# Patient Record
Sex: MALE | Race: WHITE | Employment: OTHER | URBAN - METROPOLITAN AREA
[De-identification: names, ages, dates, MRNs, and addresses within clinical notes are randomized per-mention and may not be internally consistent; named-entity substitution may affect disease eponyms.]

---

## 2021-08-02 ENCOUNTER — THERAPY VISIT (OUTPATIENT)
Dept: PHYSICAL THERAPY | Facility: CLINIC | Age: 81
End: 2021-08-02
Payer: MEDICARE

## 2021-08-02 DIAGNOSIS — Z96.653 AFTERCARE FOLLOWING BILATERAL KNEE JOINT REPLACEMENT SURGERY: ICD-10-CM

## 2021-08-02 DIAGNOSIS — Z47.1 AFTERCARE FOLLOWING BILATERAL KNEE JOINT REPLACEMENT SURGERY: ICD-10-CM

## 2021-08-02 DIAGNOSIS — R26.9 GAIT ABNORMALITY: ICD-10-CM

## 2021-08-02 DIAGNOSIS — Z96.652 PRESENCE OF LEFT ARTIFICIAL KNEE JOINT: ICD-10-CM

## 2021-08-02 DIAGNOSIS — Z96.651 PRESENCE OF RIGHT ARTIFICIAL KNEE JOINT: ICD-10-CM

## 2021-08-02 PROCEDURE — 97112 NEUROMUSCULAR REEDUCATION: CPT | Mod: GP | Performed by: PHYSICAL THERAPIST

## 2021-08-02 PROCEDURE — 97110 THERAPEUTIC EXERCISES: CPT | Mod: GP | Performed by: PHYSICAL THERAPIST

## 2021-08-02 PROCEDURE — 97161 PT EVAL LOW COMPLEX 20 MIN: CPT | Mod: GP | Performed by: PHYSICAL THERAPIST

## 2021-08-02 ASSESSMENT — ACTIVITIES OF DAILY LIVING (ADL)
GIVING WAY, BUCKLING OR SHIFTING OF KNEE: I HAVE THE SYMPTOM BUT IT DOES NOT AFFECT MY ACTIVITY
SQUAT: ACTIVITY IS FAIRLY DIFFICULT
GO UP STAIRS: ACTIVITY IS MINIMALLY DIFFICULT
KNEE_ACTIVITY_OF_DAILY_LIVING_SCORE: 75.71
KNEEL ON THE FRONT OF YOUR KNEE: ACTIVITY IS SOMEWHAT DIFFICULT
SIT WITH YOUR KNEE BENT: ACTIVITY IS MINIMALLY DIFFICULT
SWELLING: THE SYMPTOM AFFECTS MY ACTIVITY SLIGHTLY
WEAKNESS: THE SYMPTOM AFFECTS MY ACTIVITY SLIGHTLY
KNEE_ACTIVITY_OF_DAILY_LIVING_SUM: 53
STAND: ACTIVITY IS NOT DIFFICULT
RAW_SCORE: 53
GO DOWN STAIRS: ACTIVITY IS MINIMALLY DIFFICULT
RISE FROM A CHAIR: ACTIVITY IS SOMEWHAT DIFFICULT
STIFFNESS: THE SYMPTOM AFFECTS MY ACTIVITY SLIGHTLY
WALK: ACTIVITY IS NOT DIFFICULT
AS_A_RESULT_OF_YOUR_KNEE_INJURY,_HOW_WOULD_YOU_RATE_YOUR_CURRENT_LEVEL_OF_DAILY_ACTIVITY?: ABNORMAL
HOW_WOULD_YOU_RATE_THE_OVERALL_FUNCTION_OF_YOUR_KNEE_DURING_YOUR_USUAL_DAILY_ACTIVITIES?: ABNORMAL
PAIN: I DO NOT HAVE THE SYMPTOM
HOW_WOULD_YOU_RATE_THE_CURRENT_FUNCTION_OF_YOUR_KNEE_DURING_YOUR_USUAL_DAILY_ACTIVITIES_ON_A_SCALE_FROM_0_TO_100_WITH_100_BEING_YOUR_LEVEL_OF_KNEE_FUNCTION_PRIOR_TO_YOUR_INJURY_AND_0_BEING_THE_INABILITY_TO_PERFORM_ANY_OF_YOUR_USUAL_DAILY_ACTIVITIES?: 65
LIMPING: I DO NOT HAVE THE SYMPTOM

## 2021-08-02 NOTE — LETTER
DEPARTMENT OF HEALTH AND HUMAN SERVICES  CENTERS FOR MEDICARE & MEDICAID SERVICES    PLAN/UPDATED PLAN OF PROGRESS FOR OUTPATIENT REHABILITATION     PATIENTS NAME:  Gentry Akers   : 1940  PROVIDER NUMBER:    [id number]  HICN:   [id number]   [facility]ER NAME: [facility] [place]  MEDICAL RECORD NUMBER: [id number]   START OF CARE DATE:  SOC Date: 21   TYPE:  PT  PRIMARY/TREATMENT DIAGNOSIS: (Pertinent Medical Diagnosis)     Aftercare following bilateral knee joint replacement surgery  Presence of left artificial knee joint  Presence of right artificial knee joint  Gait abnormality    VISITS FROM START OF CARE:  Rxs Used: 1     Physical Therapy Initial Evaluation  Subjective:  The history is provided by the patient. No  was used.   Therapist Generated HPI Evaluation  Problem details: I had wear and tear in both knees.  I was bone on bone.  I had TKA : Left 2021, right 2021.  I had a cardiac stent 14 years ago.  I was doing rehab in [place].  .         Type of problem:  Bilateral knees.    This is a new condition.  Condition occurred with:  Degenerative joint disease.  Where condition occurred: for unknown reasons.  Patient reports pain:  Medial, lateral and anterior.  Pain quality: tightness. and is constant.  Pain radiates to:  Thigh, knee and lower leg. Pain is worse in the P.M..  Since onset symptoms are gradually improving.  Associated symptoms:  Edema. Symptoms are exacerbated by ascending stairs, descending stairs and bending/squatting  and relieved by rest.  Special tests included:  X-ray (looks good ).  Previous treatment includes physical therapy. There was moderate improvement following previous treatment.  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    Patient Health History  Gentry Akers being seen for  bilateral TKA.   Problem began: 2021.   Problem occurred: TKA   Pain is reported as 1/10 (sitffness) on pain scale.  General health as reported by patient is excellent.  Pertinent medical history includes: heart problems and osteoarthritis.      PATIENTS NAME:  Gentry Akers   : 1940  Surgeries include:  Heart surgery and orthopedic surgery. Other surgery history details: stent,  right TSA, TKA bilateral , hernia left abdominal .    Current medications:  Heparin/coumadin.    Current occupation is semiretired. .   Primary job tasks include:  Prolonged sitting, computer work, prolonged standing and pushing/pulling.   Other job/home tasks details: bike, work out club.                  Objective:  Standing Alignment:    Cervical/Thoracic:  Forward head (poor sitting posture)  Shoulder/UE:  Rounded shoulders  Lumbar:  Lordosis decr  Ankle/Foot:  Pes planus L and pes planus R  Gait:    Deviations:  Shoulder:  Shoulder hiking L and decr arm swing LHip:  Excessive flexion L and excessive flexion RAnkle:  Pronation incr L and pronation incr R  Flexibility/Screens:   Positive screens:  Knee  Lower Extremity:  Decreased left lower extremity flexibility:Hip Flexors and Quadriceps  Decreased right lower extremity flexibility:  Hip Flexors and Quadriceps       Knee Evaluation:  ROM:    AROM  Extension:  Left: 10    Right:  8  Flexion: Left: 115    Right: 120  PROM  Hyperextension: Left: 0    Right:  0  Extension: Left: 0    Right:  0  Flexion: Left: 123    Right:  122  Pain: bilateral hip abduction 4/5  Strength:   Extension:  Left: 5/5      Right: 5/5     Flexion:  Left: 5/5       Right: 5/5     Quad Set Left: Fair      Quad Set Right: Fair      Palpation:    Left knee tenderness present at:  Patellar Medial; Patellar Lateral and Patellar Superior  Right knee tenderness present at:  Patellar Medial and Patellar Lateral  Edema:  Edema of the knee: bilateral general knee swelling.  Mobility Testing:     Patellofemoral Medial:  Left: normal    Right: normal  Patellofemoral Lateral:  Left: hypomobile    Right: normal  Patellofemoral Superior:  Left: hypomobile    Right: normal  Patellofemoral Inferior:  Left: hypomobile                PATIENTS NAME:  Gentry Akers   : 1940    Functional Testing:    Quad:    Single Leg Squat:  Left:      Right:        Bilateral Leg Squat:   Significant loss of control    Proprioception:   Stork Balance Test:  Left:  3 sec  Right:  3 sec  % of Uninvolved:     Assessment/Plan:    Patient is a 81 year old male with both sides knee complaints.    Patient has the following significant findings with corresponding treatment plan.                Diagnosis 1:  Bilateral TKA   Pain -  hot/cold therapy, manual therapy, self management, education and home program  Decreased ROM/flexibility - manual therapy, therapeutic exercise, therapeutic activity and home program  Decreased joint mobility - manual therapy, therapeutic exercise, therapeutic activity and home program  Decreased strength - therapeutic exercise, therapeutic activities and home program  Impaired balance - neuro re-education, gait training, therapeutic activities and home program  Impaired gait - gait training and home program  Impaired muscle performance - neuro re-education and home program  Decreased function - therapeutic activities and home program    Therapy Evaluation Codes:   Cumulative Therapy Evaluation is: Low complexity.    Previous and current functional limitations:  (See Goal Flow Sheet for this information)    Short term and Long term goals: (See Goal Flow Sheet for this information)     Communication ability:  Patient appears to be able to clearly communicate and understand verbal and written communication and follow directions correctly.  Treatment Explanation - The following has been discussed with the patient:       RX ordered/plan of care  This patient would benefit from PT intervention to resume  "normal activities.   Rehab potential is good.  Frequency:  2 X week, once daily  Duration:  for 2 weeks tapering to 1 X a week over 8 weeks  Discharge Plan:  Achieve all LTG.  Independent in home treatment program.    Caregiver Signature/Credentials _____________________________ Date ________      Treating Provider: Jaye Szymanski PT    I have reviewed and certified the need for these services and plan of treatment while under my care.            PATIENTS NAME:  Gentry Akers   : 1940      PHYSICIAN'S SIGNATURE:   _________________________________________  Date___________   Milan Daily MD     Certification period:  Beginning of Cert date period: 21 to  End of Cert period date: 10/31/21     Functional Level Progress Report: Please see attached \"Goal Flow sheet for Functional level.\"    ____X____ Continue Services or       ________ DC Services                Service dates: From  SOC Date: 21 date to present                         "

## 2021-08-02 NOTE — PROGRESS NOTES
Physical Therapy Initial Evaluation  Subjective:  The history is provided by the patient. No  was used.   Therapist Generated HPI Evaluation  Problem details: I had wear and tear in both knees.  I was bone on bone.  I had TKA : Left 6/21/2021, right 6/24/2021.  I had a cardiac stent 14 years ago.  I was doing rehab in [place].  .         Type of problem:  Bilateral knees.    This is a new condition.  Condition occurred with:  Degenerative joint disease.  Where condition occurred: for unknown reasons.  Patient reports pain:  Medial, lateral and anterior.  Pain quality: tightness. and is constant.  Pain radiates to:  Thigh, knee and lower leg. Pain is worse in the P.M..  Since onset symptoms are gradually improving.  Associated symptoms:  Edema. Symptoms are exacerbated by ascending stairs, descending stairs and bending/squatting  and relieved by rest.  Special tests included:  X-ray (looks good ).  Previous treatment includes physical therapy. There was moderate improvement following previous treatment.  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    Patient Health History  Gentry Akers being seen for bilateral TKA.     Problem began: 6/24/2021.   Problem occurred: TKA   Pain is reported as 1/10 (sitffness) on pain scale.  General health as reported by patient is excellent.  Pertinent medical history includes: heart problems and osteoarthritis.        Surgeries include:  Heart surgery and orthopedic surgery. Other surgery history details: stent,  right TSA, TKA bilateral , hernia left abdominal .    Current medications:  Heparin/coumadin.    Current occupation is semiretired. .   Primary job tasks include:  Prolonged sitting, computer work, prolonged standing and pushing/pulling.   Other job/home tasks details: bike, work out club.                                   Objective:  Standing Alignment:    Cervical/Thoracic:  Forward head (poor sitting posture)  Shoulder/UE:  Rounded shoulders  Lumbar:  Lordosis decr        Ankle/Foot:  Pes planus L and pes planus R    Gait:      Deviations:  Shoulder:  Shoulder hiking L and decr arm swing LHip:  Excessive flexion L and excessive flexion RAnkle:  Pronation incr L and pronation incr R    Flexibility/Screens:   Positive screens:  Knee    Lower Extremity:  Decreased left lower extremity flexibility:Hip Flexors and Quadriceps    Decreased right lower extremity flexibility:  Hip Flexors and Quadriceps                                                      Knee Evaluation:  ROM:    AROM      Extension:  Left: 10    Right:  8  Flexion: Left: 115    Right: 120  PROM    Hyperextension: Left: 0    Right:  0  Extension: Left: 0    Right:  0  Flexion: Left: 123    Right:  122  Pain: bilateral hip abduction 4/5    Strength:     Extension:  Left: 5/5   Pain:      Right: 5/5   Pain:  Flexion:  Left: 5/5   Pain:      Right: 5/5   Pain:    Quad Set Left: Fair    Pain:   Quad Set Right: Fair    Pain:      Palpation:    Left knee tenderness present at:  Patellar Medial; Patellar Lateral and Patellar Superior  Right knee tenderness present at:  Patellar Medial and Patellar Lateral  Edema:  Edema of the knee: bilateral general knee swelling.    Mobility Testing:      Patellofemoral Medial:  Left: normal    Right: normal  Patellofemoral Lateral:  Left: hypomobile    Right: normal  Patellofemoral Superior:  Left: hypomobile    Right: normal  Patellofemoral Inferior:  Left: hypomobile      Functional Testing:          Quad:    Single Leg Squat:  Left:      Right:        Bilateral Leg Squat:   Significant loss of control      Proprioception:   Stork Balance Test:  Left:  3 sec  Right:  3 sec  % of Uninvolved:           General     ROS    Assessment/Plan:    Patient is a 81 year old male with both sides knee complaints.    Patient has the following  significant findings with corresponding treatment plan.                Diagnosis 1:  Bilateral TKA   Pain -  hot/cold therapy, manual therapy, self management, education and home program  Decreased ROM/flexibility - manual therapy, therapeutic exercise, therapeutic activity and home program  Decreased joint mobility - manual therapy, therapeutic exercise, therapeutic activity and home program  Decreased strength - therapeutic exercise, therapeutic activities and home program  Impaired balance - neuro re-education, gait training, therapeutic activities and home program  Impaired gait - gait training and home program  Impaired muscle performance - neuro re-education and home program  Decreased function - therapeutic activities and home program    Therapy Evaluation Codes:   Cumulative Therapy Evaluation is: Low complexity.    Previous and current functional limitations:  (See Goal Flow Sheet for this information)    Short term and Long term goals: (See Goal Flow Sheet for this information)     Communication ability:  Patient appears to be able to clearly communicate and understand verbal and written communication and follow directions correctly.  Treatment Explanation - The following has been discussed with the patient:   RX ordered/plan of care  This patient would benefit from PT intervention to resume normal activities.   Rehab potential is good.    Frequency:  2 X week, once daily  Duration:  for 2 weeks tapering to 1 X a week over 8 weeks  Discharge Plan:  Achieve all LTG.  Independent in home treatment program.    Please refer to the daily flowsheet for treatment today, total treatment time and time spent performing 1:1 timed codes.

## 2021-08-04 ENCOUNTER — THERAPY VISIT (OUTPATIENT)
Dept: PHYSICAL THERAPY | Facility: CLINIC | Age: 81
End: 2021-08-04
Payer: MEDICARE

## 2021-08-04 DIAGNOSIS — Z96.651 PRESENCE OF RIGHT ARTIFICIAL KNEE JOINT: ICD-10-CM

## 2021-08-04 DIAGNOSIS — Z47.1 AFTERCARE FOLLOWING BILATERAL KNEE JOINT REPLACEMENT SURGERY: ICD-10-CM

## 2021-08-04 DIAGNOSIS — Z96.652 PRESENCE OF LEFT ARTIFICIAL KNEE JOINT: ICD-10-CM

## 2021-08-04 DIAGNOSIS — Z96.653 AFTERCARE FOLLOWING BILATERAL KNEE JOINT REPLACEMENT SURGERY: ICD-10-CM

## 2021-08-04 DIAGNOSIS — R26.9 GAIT ABNORMALITY: ICD-10-CM

## 2021-08-04 PROCEDURE — 97110 THERAPEUTIC EXERCISES: CPT | Mod: GP | Performed by: PHYSICAL THERAPIST

## 2021-08-04 PROCEDURE — 97112 NEUROMUSCULAR REEDUCATION: CPT | Mod: GP | Performed by: PHYSICAL THERAPIST

## 2021-08-04 PROCEDURE — 97530 THERAPEUTIC ACTIVITIES: CPT | Mod: GP | Performed by: PHYSICAL THERAPIST

## 2021-08-09 ENCOUNTER — THERAPY VISIT (OUTPATIENT)
Dept: PHYSICAL THERAPY | Facility: CLINIC | Age: 81
End: 2021-08-09
Payer: MEDICARE

## 2021-08-09 DIAGNOSIS — Z96.652 PRESENCE OF LEFT ARTIFICIAL KNEE JOINT: ICD-10-CM

## 2021-08-09 DIAGNOSIS — Z96.653 AFTERCARE FOLLOWING BILATERAL KNEE JOINT REPLACEMENT SURGERY: ICD-10-CM

## 2021-08-09 DIAGNOSIS — Z47.1 AFTERCARE FOLLOWING BILATERAL KNEE JOINT REPLACEMENT SURGERY: ICD-10-CM

## 2021-08-09 DIAGNOSIS — R26.9 GAIT ABNORMALITY: ICD-10-CM

## 2021-08-09 DIAGNOSIS — Z96.651 PRESENCE OF RIGHT ARTIFICIAL KNEE JOINT: ICD-10-CM

## 2021-08-09 PROCEDURE — 97112 NEUROMUSCULAR REEDUCATION: CPT | Mod: GP | Performed by: PHYSICAL THERAPIST

## 2021-08-09 PROCEDURE — 97530 THERAPEUTIC ACTIVITIES: CPT | Mod: GP | Performed by: PHYSICAL THERAPIST

## 2021-08-09 PROCEDURE — 97110 THERAPEUTIC EXERCISES: CPT | Mod: GP | Performed by: PHYSICAL THERAPIST

## 2021-08-16 ENCOUNTER — THERAPY VISIT (OUTPATIENT)
Dept: PHYSICAL THERAPY | Facility: CLINIC | Age: 81
End: 2021-08-16
Payer: MEDICARE

## 2021-08-16 DIAGNOSIS — Z96.653 AFTERCARE FOLLOWING BILATERAL KNEE JOINT REPLACEMENT SURGERY: ICD-10-CM

## 2021-08-16 DIAGNOSIS — Z96.651 PRESENCE OF RIGHT ARTIFICIAL KNEE JOINT: ICD-10-CM

## 2021-08-16 DIAGNOSIS — Z47.1 AFTERCARE FOLLOWING BILATERAL KNEE JOINT REPLACEMENT SURGERY: ICD-10-CM

## 2021-08-16 DIAGNOSIS — Z96.652 PRESENCE OF LEFT ARTIFICIAL KNEE JOINT: ICD-10-CM

## 2021-08-16 DIAGNOSIS — R26.9 GAIT ABNORMALITY: ICD-10-CM

## 2021-08-16 PROCEDURE — 97530 THERAPEUTIC ACTIVITIES: CPT | Mod: GP | Performed by: PHYSICAL THERAPIST

## 2021-08-16 PROCEDURE — 97112 NEUROMUSCULAR REEDUCATION: CPT | Mod: GP | Performed by: PHYSICAL THERAPIST

## 2021-08-16 PROCEDURE — 97110 THERAPEUTIC EXERCISES: CPT | Mod: GP | Performed by: PHYSICAL THERAPIST

## 2021-08-23 ENCOUNTER — THERAPY VISIT (OUTPATIENT)
Dept: PHYSICAL THERAPY | Facility: CLINIC | Age: 81
End: 2021-08-23
Payer: MEDICARE

## 2021-08-23 DIAGNOSIS — Z96.652 PRESENCE OF LEFT ARTIFICIAL KNEE JOINT: ICD-10-CM

## 2021-08-23 DIAGNOSIS — Z96.653 AFTERCARE FOLLOWING BILATERAL KNEE JOINT REPLACEMENT SURGERY: ICD-10-CM

## 2021-08-23 DIAGNOSIS — R26.9 GAIT ABNORMALITY: ICD-10-CM

## 2021-08-23 DIAGNOSIS — Z47.1 AFTERCARE FOLLOWING BILATERAL KNEE JOINT REPLACEMENT SURGERY: ICD-10-CM

## 2021-08-23 DIAGNOSIS — Z96.651 PRESENCE OF RIGHT ARTIFICIAL KNEE JOINT: ICD-10-CM

## 2021-08-23 PROCEDURE — 97110 THERAPEUTIC EXERCISES: CPT | Mod: GP | Performed by: PHYSICAL THERAPIST

## 2021-08-23 PROCEDURE — 97530 THERAPEUTIC ACTIVITIES: CPT | Mod: GP | Performed by: PHYSICAL THERAPIST

## 2021-08-23 PROCEDURE — 97112 NEUROMUSCULAR REEDUCATION: CPT | Mod: GP | Performed by: PHYSICAL THERAPIST

## 2021-08-23 ASSESSMENT — ACTIVITIES OF DAILY LIVING (ADL)
AS_A_RESULT_OF_YOUR_KNEE_INJURY,_HOW_WOULD_YOU_RATE_YOUR_CURRENT_LEVEL_OF_DAILY_ACTIVITY?: NORMAL
WEAKNESS: I HAVE THE SYMPTOM BUT IT DOES NOT AFFECT MY ACTIVITY
GO UP STAIRS: ACTIVITY IS MINIMALLY DIFFICULT
PAIN: I DO NOT HAVE THE SYMPTOM
GIVING WAY, BUCKLING OR SHIFTING OF KNEE: I DO NOT HAVE THE SYMPTOM
RISE FROM A CHAIR: ACTIVITY IS MINIMALLY DIFFICULT
RAW_SCORE: 64
HOW_WOULD_YOU_RATE_THE_OVERALL_FUNCTION_OF_YOUR_KNEE_DURING_YOUR_USUAL_DAILY_ACTIVITIES?: NEARLY NORMAL
WALK: ACTIVITY IS NOT DIFFICULT
HOW_WOULD_YOU_RATE_THE_CURRENT_FUNCTION_OF_YOUR_KNEE_DURING_YOUR_USUAL_DAILY_ACTIVITIES_ON_A_SCALE_FROM_0_TO_100_WITH_100_BEING_YOUR_LEVEL_OF_KNEE_FUNCTION_PRIOR_TO_YOUR_INJURY_AND_0_BEING_THE_INABILITY_TO_PERFORM_ANY_OF_YOUR_USUAL_DAILY_ACTIVITIES?: 85
KNEE_ACTIVITY_OF_DAILY_LIVING_SCORE: 91.43
KNEE_ACTIVITY_OF_DAILY_LIVING_SUM: 64
STIFFNESS: I HAVE THE SYMPTOM BUT IT DOES NOT AFFECT MY ACTIVITY
SIT WITH YOUR KNEE BENT: ACTIVITY IS NOT DIFFICULT
SWELLING: I HAVE THE SYMPTOM BUT IT DOES NOT AFFECT MY ACTIVITY
KNEEL ON THE FRONT OF YOUR KNEE: ACTIVITY IS MINIMALLY DIFFICULT
GO DOWN STAIRS: ACTIVITY IS NOT DIFFICULT
SQUAT: ACTIVITY IS NOT DIFFICULT
STAND: ACTIVITY IS NOT DIFFICULT
LIMPING: I DO NOT HAVE THE SYMPTOM

## 2021-08-23 NOTE — PROGRESS NOTES
Subjective:  HPI  Physical Exam       Knee Activity of Daily Living Score: 91.43            Objective:  System    Physical Exam    General     ROS    Assessment/Plan:    DISCHARGE REPORT    Progress reporting period is from 8/2/2021 to 8/23/2021.       SUBJECTIVE  Subjective changes noted by patient:  I was able to golf yesterday, I got tired, but no problems with the knees.  Stiffness in the morning.  I am doing good.  I am about 84% of my normal.      Current Pain level: 0/10.      Initial Pain level: 5/10.   Changes in function:  Yes (See Goal flowsheet attached for changes in current functional level)  Adverse reaction to treatment or activity: None    OBJECTIVE  Changes noted in objective findings:  Yes, Balance : right 6 sec, left 13 sec.  Strength:  toe raises single leg difficulty, hip abduction right 4+/5, left 5/5, ham 5/5, quad 5-/5.   PROM 0-0-132 bilateral.  Walking with heel toe pattern working on step length and timing.  Able to go up and down 6 inch step with railing.  Discussed with patient progression of exercises and home exercise program.       ASSESSMENT/PLAN  Updated problem list and treatment plan: Diagnosis 1:  Bilateral TKA  Decreased strength - home program  Impaired balance - home program  Impaired gait - home program  Impaired muscle performance - home program  Decreased function - home program  STG/LTGs have been met or progress has been made towards goals:  Yes (See Goal flow sheet completed today.)  Assessment of Progress: The patient's condition is improving.  The patient's condition has potential to improve.  Self Management Plans:  Patient has been instructed in a home treatment program.  Patient  has been instructed in self management of symptoms.      Recommendations:  Patient would like to try exercises on his own.  Patient to call if any questions.  Emphasis to patient not to over do and to watch control.  Patient does have appointments left on original order and may return if  unable to progress with activity.  Thank you for the opportunity of working with this motivated individual.      Please refer to the daily flowsheet for treatment today, total treatment time and time spent performing 1:1 timed codes.

## 2021-08-23 NOTE — LETTER
[facility]  [address]  [place] [address]  [phone]    2021    Re: Gentry Akers   :   1940  MRN:  [id number]   REFERRING PHYSICIAN:   Milan Daily MD [facility]  Date of Initial Evaluation:  21  Visits:  Rxs Used: 5  Reason for Referral:     Aftercare following bilateral knee joint replacement surgery  Presence of left artificial knee joint  Presence of right artificial knee joint  Gait abnormality  Knee Activity of Daily Living Score: 91.43            DISCHARGE REPORT  Progress reporting period is from 2021 to 2021.       SUBJECTIVE  Subjective changes noted by patient:  I was able to golf yesterday, I got tired, but no problems with the knees.  Stiffness in the morning.  I am doing good.  I am about 84% of my normal.      Current Pain level: 0/10.      Initial Pain level: 5/10.   Changes in function:  Yes (See Goal flowsheet attached for changes in current functional level)  Adverse reaction to treatment or activity: None    OBJECTIVE  Changes noted in objective findings:  Yes, Balance : right 6 sec, left 13 sec.  Strength:  toe raises single leg difficulty, hip abduction right 4+/5, left 5/5, ham 5/5, quad 5-/5.   PROM 0-0-132 bilateral.  Walking with heel toe pattern working on step length and timing.  Able to go up and down 6 inch step with railing.  Discussed with patient progression of exercises and home exercise program.     ASSESSMENT/PLAN  Updated problem list and treatment plan: Diagnosis 1:  Bilateral TKA  Decreased strength - home program  Impaired balance - home program  Impaired gait - home program  Impaired muscle performance - home program  Decreased function - home program  STG/LTGs have been met or progress has been made towards goals:  Yes (See Goal flow sheet completed today.)  Assessment of Progress: The patient's condition is  improving.  The patient's condition has potential to improve.  Self Management Plans:  Patient has been instructed in a home treatment program.  Patient  has been instructed in self management of symptoms.  Re: Gentry Akers   :   1940      Recommendations:  Patient would like to try exercises on his own.  Patient to call if any questions.  Emphasis to patient not to over do and to watch control.  Patient does have appointments left on original order and may return if unable to progress with activity.  Thank you for the opportunity of working with this motivated individual.          Thank you for your referral.    INQUIRIES  Therapist: Jaye Szymanski PT  [facility] [place]  [address] [address]  Phone: [phone]  Fax: [phone]

## 2021-10-14 PROBLEM — R26.9 GAIT ABNORMALITY: Status: RESOLVED | Noted: 2021-08-02 | Resolved: 2021-10-14

## 2021-10-14 PROBLEM — Z47.1 AFTERCARE FOLLOWING BILATERAL KNEE JOINT REPLACEMENT SURGERY: Status: RESOLVED | Noted: 2021-08-02 | Resolved: 2021-10-14

## 2021-10-14 PROBLEM — Z96.653 AFTERCARE FOLLOWING BILATERAL KNEE JOINT REPLACEMENT SURGERY: Status: RESOLVED | Noted: 2021-08-02 | Resolved: 2021-10-14

## 2021-10-14 PROBLEM — Z96.652 PRESENCE OF LEFT ARTIFICIAL KNEE JOINT: Status: RESOLVED | Noted: 2021-08-02 | Resolved: 2021-10-14

## 2021-10-14 PROBLEM — Z96.651 PRESENCE OF RIGHT ARTIFICIAL KNEE JOINT: Status: RESOLVED | Noted: 2021-08-02 | Resolved: 2021-10-14

## 2021-11-16 ENCOUNTER — LAB (OUTPATIENT)
Dept: LAB | Facility: CLINIC | Age: 81
End: 2021-11-16
Payer: MEDICARE

## 2021-11-16 DIAGNOSIS — R79.89 SERUM THYROID HORMONE DECREASED: Primary | ICD-10-CM

## 2021-11-16 DIAGNOSIS — E07.9 DISEASE OF THYROID GLAND: ICD-10-CM

## 2021-11-16 DIAGNOSIS — E78.2 MIXED HYPERLIPIDEMIA: ICD-10-CM

## 2021-11-16 DIAGNOSIS — E55.9 VITAMIN D DEFICIENCY DISEASE: ICD-10-CM

## 2021-11-16 LAB
ALBUMIN SERPL-MCNC: 3.7 G/DL (ref 3.4–5)
ALP SERPL-CCNC: 76 U/L (ref 40–150)
ALT SERPL W P-5'-P-CCNC: 37 U/L (ref 0–70)
ANION GAP SERPL CALCULATED.3IONS-SCNC: 2 MMOL/L (ref 3–14)
AST SERPL W P-5'-P-CCNC: 36 U/L (ref 0–45)
BASOPHILS # BLD AUTO: 0 10E3/UL (ref 0–0.2)
BASOPHILS NFR BLD AUTO: 1 %
BILIRUB SERPL-MCNC: 0.8 MG/DL (ref 0.2–1.3)
BUN SERPL-MCNC: 27 MG/DL (ref 7–30)
CALCIUM SERPL-MCNC: 8.7 MG/DL (ref 8.5–10.1)
CHLORIDE BLD-SCNC: 109 MMOL/L (ref 94–109)
CHOLEST SERPL-MCNC: 154 MG/DL
CO2 SERPL-SCNC: 29 MMOL/L (ref 20–32)
CREAT SERPL-MCNC: 0.95 MG/DL (ref 0.66–1.25)
EOSINOPHIL # BLD AUTO: 0.3 10E3/UL (ref 0–0.7)
EOSINOPHIL NFR BLD AUTO: 4 %
ERYTHROCYTE [DISTWIDTH] IN BLOOD BY AUTOMATED COUNT: 13.3 % (ref 10–15)
FASTING STATUS PATIENT QL REPORTED: NO
FSH SERPL-ACNC: 36.8 IU/L (ref 0.7–10.8)
GFR SERPL CREATININE-BSD FRML MDRD: 75 ML/MIN/1.73M2
GLUCOSE BLD-MCNC: 85 MG/DL (ref 70–99)
HCT VFR BLD AUTO: 37.2 % (ref 40–53)
HDLC SERPL-MCNC: 89 MG/DL
HGB BLD-MCNC: 12.6 G/DL (ref 13.3–17.7)
IMM GRANULOCYTES # BLD: 0 10E3/UL
IMM GRANULOCYTES NFR BLD: 0 %
LDLC SERPL CALC-MCNC: 48 MG/DL
LH SERPL-ACNC: 19.2 IU/L (ref 3.1–34.6)
LYMPHOCYTES # BLD AUTO: 1.2 10E3/UL (ref 0.8–5.3)
LYMPHOCYTES NFR BLD AUTO: 19 %
MCH RBC QN AUTO: 31.5 PG (ref 26.5–33)
MCHC RBC AUTO-ENTMCNC: 33.9 G/DL (ref 31.5–36.5)
MCV RBC AUTO: 93 FL (ref 78–100)
MONOCYTES # BLD AUTO: 0.6 10E3/UL (ref 0–1.3)
MONOCYTES NFR BLD AUTO: 9 %
NEUTROPHILS # BLD AUTO: 4.1 10E3/UL (ref 1.6–8.3)
NEUTROPHILS NFR BLD AUTO: 67 %
NONHDLC SERPL-MCNC: 65 MG/DL
NRBC # BLD AUTO: 0 10E3/UL
NRBC BLD AUTO-RTO: 0 /100
PLATELET # BLD AUTO: 199 10E3/UL (ref 150–450)
POTASSIUM BLD-SCNC: 3.9 MMOL/L (ref 3.4–5.3)
PROCALCITONIN SERPL-MCNC: <0.05 NG/ML
PROT SERPL-MCNC: 6.7 G/DL (ref 6.8–8.8)
RBC # BLD AUTO: 4 10E6/UL (ref 4.4–5.9)
SODIUM SERPL-SCNC: 140 MMOL/L (ref 133–144)
TRIGL SERPL-MCNC: 86 MG/DL
TSH SERPL DL<=0.005 MIU/L-ACNC: 1.38 MU/L (ref 0.4–4)
WBC # BLD AUTO: 6.1 10E3/UL (ref 4–11)

## 2021-11-16 PROCEDURE — 84270 ASSAY OF SEX HORMONE GLOBUL: CPT

## 2021-11-16 PROCEDURE — 84445 ASSAY OF TSI GLOBULIN: CPT | Mod: 90

## 2021-11-16 PROCEDURE — 83001 ASSAY OF GONADOTROPIN (FSH): CPT

## 2021-11-16 PROCEDURE — 86800 THYROGLOBULIN ANTIBODY: CPT | Mod: 90

## 2021-11-16 PROCEDURE — 84403 ASSAY OF TOTAL TESTOSTERONE: CPT

## 2021-11-16 PROCEDURE — 86376 MICROSOMAL ANTIBODY EACH: CPT

## 2021-11-16 PROCEDURE — 85025 COMPLETE CBC W/AUTO DIFF WBC: CPT

## 2021-11-16 PROCEDURE — 80053 COMPREHEN METABOLIC PANEL: CPT

## 2021-11-16 PROCEDURE — 84145 PROCALCITONIN (PCT): CPT

## 2021-11-16 PROCEDURE — 83002 ASSAY OF GONADOTROPIN (LH): CPT

## 2021-11-16 PROCEDURE — 36415 COLL VENOUS BLD VENIPUNCTURE: CPT

## 2021-11-16 PROCEDURE — 84443 ASSAY THYROID STIM HORMONE: CPT

## 2021-11-16 PROCEDURE — 84432 ASSAY OF THYROGLOBULIN: CPT | Mod: 90

## 2021-11-16 PROCEDURE — 80061 LIPID PANEL: CPT

## 2021-11-17 LAB
SHBG SERPL-SCNC: 79 NMOL/L (ref 11–80)
THYROPEROXIDASE AB SERPL-ACNC: <10 IU/ML

## 2021-11-18 LAB
SHBG SERPL-SCNC: 79 NMOL/L (ref 11–80)
TESTOST FREE SERPL-MCNC: 2.59 NG/DL
TESTOST SERPL-MCNC: 248 NG/DL (ref 240–950)

## 2021-11-19 LAB — TSI SER-ACNC: <1 TSI INDEX

## 2021-11-22 LAB
DEPRECATED CALCIDIOL+CALCIFEROL SERPL-MC: <46 UG/L (ref 20–75)
VITAMIN D2 SERPL-MCNC: <5 UG/L
VITAMIN D3 SERPL-MCNC: 41 UG/L

## 2021-11-29 LAB — SCANNED LAB RESULT: NORMAL
